# Patient Record
Sex: FEMALE | Race: BLACK OR AFRICAN AMERICAN | NOT HISPANIC OR LATINO | URBAN - METROPOLITAN AREA
[De-identification: names, ages, dates, MRNs, and addresses within clinical notes are randomized per-mention and may not be internally consistent; named-entity substitution may affect disease eponyms.]

---

## 2017-03-01 ENCOUNTER — EMERGENCY (EMERGENCY)
Facility: HOSPITAL | Age: 27
LOS: 0 days | Discharge: HOME | End: 2017-03-01
Admitting: INTERNAL MEDICINE

## 2017-06-27 DIAGNOSIS — R21 RASH AND OTHER NONSPECIFIC SKIN ERUPTION: ICD-10-CM

## 2017-06-27 DIAGNOSIS — Z91.048 OTHER NONMEDICINAL SUBSTANCE ALLERGY STATUS: ICD-10-CM

## 2017-06-27 DIAGNOSIS — Z91.018 ALLERGY TO OTHER FOODS: ICD-10-CM

## 2017-06-27 DIAGNOSIS — J45.909 UNSPECIFIED ASTHMA, UNCOMPLICATED: ICD-10-CM

## 2017-06-27 DIAGNOSIS — B00.1 HERPESVIRAL VESICULAR DERMATITIS: ICD-10-CM

## 2017-07-12 PROBLEM — Z00.00 ENCOUNTER FOR PREVENTIVE HEALTH EXAMINATION: Status: ACTIVE | Noted: 2017-07-12

## 2017-10-26 ENCOUNTER — APPOINTMENT (OUTPATIENT)
Dept: OTOLARYNGOLOGY | Facility: CLINIC | Age: 27
End: 2017-10-26

## 2017-11-13 ENCOUNTER — APPOINTMENT (OUTPATIENT)
Dept: OTOLARYNGOLOGY | Facility: CLINIC | Age: 27
End: 2017-11-13

## 2021-06-29 ENCOUNTER — EMERGENCY (EMERGENCY)
Facility: HOSPITAL | Age: 31
LOS: 0 days | Discharge: HOME | End: 2021-06-29
Attending: EMERGENCY MEDICINE | Admitting: EMERGENCY MEDICINE
Payer: COMMERCIAL

## 2021-06-29 VITALS
RESPIRATION RATE: 18 BRPM | TEMPERATURE: 97 F | DIASTOLIC BLOOD PRESSURE: 88 MMHG | HEART RATE: 62 BPM | SYSTOLIC BLOOD PRESSURE: 137 MMHG | OXYGEN SATURATION: 99 %

## 2021-06-29 DIAGNOSIS — Z98.891 HISTORY OF UTERINE SCAR FROM PREVIOUS SURGERY: Chronic | ICD-10-CM

## 2021-06-29 DIAGNOSIS — N93.9 ABNORMAL UTERINE AND VAGINAL BLEEDING, UNSPECIFIED: ICD-10-CM

## 2021-06-29 DIAGNOSIS — Z87.59 PERSONAL HISTORY OF OTHER COMPLICATIONS OF PREGNANCY, CHILDBIRTH AND THE PUERPERIUM: ICD-10-CM

## 2021-06-29 DIAGNOSIS — M79.605 PAIN IN LEFT LEG: ICD-10-CM

## 2021-06-29 DIAGNOSIS — J45.909 UNSPECIFIED ASTHMA, UNCOMPLICATED: ICD-10-CM

## 2021-06-29 LAB
ALBUMIN SERPL ELPH-MCNC: 3.9 G/DL — SIGNIFICANT CHANGE UP (ref 3.5–5.2)
ALP SERPL-CCNC: 56 U/L — SIGNIFICANT CHANGE UP (ref 30–115)
ALT FLD-CCNC: 18 U/L — SIGNIFICANT CHANGE UP (ref 0–41)
ANION GAP SERPL CALC-SCNC: 9 MMOL/L — SIGNIFICANT CHANGE UP (ref 7–14)
APPEARANCE UR: ABNORMAL
APTT BLD: 39.2 SEC — SIGNIFICANT CHANGE UP (ref 27–39.2)
AST SERPL-CCNC: 29 U/L — SIGNIFICANT CHANGE UP (ref 0–41)
BACTERIA # UR AUTO: NEGATIVE — SIGNIFICANT CHANGE UP
BASOPHILS # BLD AUTO: 0.03 K/UL — SIGNIFICANT CHANGE UP (ref 0–0.2)
BASOPHILS NFR BLD AUTO: 0.4 % — SIGNIFICANT CHANGE UP (ref 0–1)
BILIRUB SERPL-MCNC: 0.4 MG/DL — SIGNIFICANT CHANGE UP (ref 0.2–1.2)
BILIRUB UR-MCNC: NEGATIVE — SIGNIFICANT CHANGE UP
BUN SERPL-MCNC: 12 MG/DL — SIGNIFICANT CHANGE UP (ref 10–20)
CALCIUM SERPL-MCNC: 9.2 MG/DL — SIGNIFICANT CHANGE UP (ref 8.5–10.1)
CHLORIDE SERPL-SCNC: 105 MMOL/L — SIGNIFICANT CHANGE UP (ref 98–110)
CO2 SERPL-SCNC: 23 MMOL/L — SIGNIFICANT CHANGE UP (ref 17–32)
COLOR SPEC: COLORLESS — SIGNIFICANT CHANGE UP
CREAT SERPL-MCNC: 0.8 MG/DL — SIGNIFICANT CHANGE UP (ref 0.7–1.5)
DIFF PNL FLD: ABNORMAL
EOSINOPHIL # BLD AUTO: 0.46 K/UL — SIGNIFICANT CHANGE UP (ref 0–0.7)
EOSINOPHIL NFR BLD AUTO: 6.8 % — SIGNIFICANT CHANGE UP (ref 0–8)
EPI CELLS # UR: 2 /HPF — SIGNIFICANT CHANGE UP (ref 0–5)
GLUCOSE SERPL-MCNC: 84 MG/DL — SIGNIFICANT CHANGE UP (ref 70–99)
GLUCOSE UR QL: NEGATIVE — SIGNIFICANT CHANGE UP
HCT VFR BLD CALC: 37 % — SIGNIFICANT CHANGE UP (ref 37–47)
HGB BLD-MCNC: 11.4 G/DL — LOW (ref 12–16)
HYALINE CASTS # UR AUTO: 3 /LPF — SIGNIFICANT CHANGE UP (ref 0–7)
IMM GRANULOCYTES NFR BLD AUTO: 0.3 % — SIGNIFICANT CHANGE UP (ref 0.1–0.3)
INR BLD: 1.03 RATIO — SIGNIFICANT CHANGE UP (ref 0.65–1.3)
KETONES UR-MCNC: NEGATIVE — SIGNIFICANT CHANGE UP
LEUKOCYTE ESTERASE UR-ACNC: NEGATIVE — SIGNIFICANT CHANGE UP
LYMPHOCYTES # BLD AUTO: 2.08 K/UL — SIGNIFICANT CHANGE UP (ref 1.2–3.4)
LYMPHOCYTES # BLD AUTO: 31 % — SIGNIFICANT CHANGE UP (ref 20.5–51.1)
MCHC RBC-ENTMCNC: 24.1 PG — LOW (ref 27–31)
MCHC RBC-ENTMCNC: 30.8 G/DL — LOW (ref 32–37)
MCV RBC AUTO: 78.1 FL — LOW (ref 81–99)
MONOCYTES # BLD AUTO: 0.4 K/UL — SIGNIFICANT CHANGE UP (ref 0.1–0.6)
MONOCYTES NFR BLD AUTO: 6 % — SIGNIFICANT CHANGE UP (ref 1.7–9.3)
NEUTROPHILS # BLD AUTO: 3.73 K/UL — SIGNIFICANT CHANGE UP (ref 1.4–6.5)
NEUTROPHILS NFR BLD AUTO: 55.5 % — SIGNIFICANT CHANGE UP (ref 42.2–75.2)
NITRITE UR-MCNC: NEGATIVE — SIGNIFICANT CHANGE UP
NRBC # BLD: 0 /100 WBCS — SIGNIFICANT CHANGE UP (ref 0–0)
PH UR: 6.5 — SIGNIFICANT CHANGE UP (ref 5–8)
PLATELET # BLD AUTO: 254 K/UL — SIGNIFICANT CHANGE UP (ref 130–400)
POTASSIUM SERPL-MCNC: 5.2 MMOL/L — HIGH (ref 3.5–5)
POTASSIUM SERPL-SCNC: 5.2 MMOL/L — HIGH (ref 3.5–5)
PROT SERPL-MCNC: 7.5 G/DL — SIGNIFICANT CHANGE UP (ref 6–8)
PROT UR-MCNC: SIGNIFICANT CHANGE UP
PROTHROM AB SERPL-ACNC: 11.8 SEC — SIGNIFICANT CHANGE UP (ref 9.95–12.87)
RBC # BLD: 4.74 M/UL — SIGNIFICANT CHANGE UP (ref 4.2–5.4)
RBC # FLD: 15.5 % — HIGH (ref 11.5–14.5)
RBC CASTS # UR COMP ASSIST: >720 /HPF — HIGH (ref 0–4)
SODIUM SERPL-SCNC: 137 MMOL/L — SIGNIFICANT CHANGE UP (ref 135–146)
SP GR SPEC: 1.01 — SIGNIFICANT CHANGE UP (ref 1.01–1.03)
UROBILINOGEN FLD QL: SIGNIFICANT CHANGE UP
WBC # BLD: 6.72 K/UL — SIGNIFICANT CHANGE UP (ref 4.8–10.8)
WBC # FLD AUTO: 6.72 K/UL — SIGNIFICANT CHANGE UP (ref 4.8–10.8)
WBC UR QL: 2 /HPF — SIGNIFICANT CHANGE UP (ref 0–5)

## 2021-06-29 PROCEDURE — 99285 EMERGENCY DEPT VISIT HI MDM: CPT

## 2021-06-29 PROCEDURE — 99282 EMERGENCY DEPT VISIT SF MDM: CPT

## 2021-06-29 PROCEDURE — 76830 TRANSVAGINAL US NON-OB: CPT | Mod: 26

## 2021-06-29 PROCEDURE — 93970 EXTREMITY STUDY: CPT | Mod: 26

## 2021-06-29 RX ORDER — SODIUM CHLORIDE 9 MG/ML
1000 INJECTION, SOLUTION INTRAVENOUS ONCE
Refills: 0 | Status: COMPLETED | OUTPATIENT
Start: 2021-06-29 | End: 2021-06-29

## 2021-06-29 RX ADMIN — SODIUM CHLORIDE 1000 MILLILITER(S): 9 INJECTION, SOLUTION INTRAVENOUS at 15:17

## 2021-06-29 NOTE — ED PROVIDER NOTE - OBJECTIVE STATEMENT
The pt is a 30y F w/ hx of asthma, s/p  7 wks ago presenting with bright red vaginal bleeding x2 times. Pt states after surgery, she had heavy vaginal bleeding which progressively improved and completely resolved over weeks. Then started again 2 days ago, says today she noted a clot in the toilet bowl. ~1 am tonight she changed her pad, then 2 hours later she had completely soaked through the pad, underwear, shorts, and sheets. She changed and went back to sleep at ~4 am, then woke up at ~10 am and noted she had soaked completely through once again. Denies lightheadedness, chest pain, palpitations, SOB, N/V, abdominal pain, diarrhea, dysuria. Also states that around the time bleeding started 2 days ago, she started having LLE pain. Pain is worst around the shin/knee area and radiates up the leg. No injury/trauma. Does have a family hx of blood clots, states her mom is on AC for this.

## 2021-06-29 NOTE — ED PROVIDER NOTE - NS ED ROS FT
Constitutional: (-) fever  Eyes/ENT: (-) blurry vision, (-) epistaxis  Cardiovascular: (-) chest pain, (-) syncope  Respiratory: (-) cough, (-) shortness of breath  Gastrointestinal: (-) vomiting, (-) diarrhea  : (+) vaginal bleeding  Musculoskeletal: (-) neck pain, (-) back pain, (+) LLE pain   Integumentary: (-) rash, (-) edema  Neurological: (-) headache, (-) altered mental status  Psychiatric: (-) hallucinations  Allergic/Immunologic: (-) pruritus

## 2021-06-29 NOTE — ED PROVIDER NOTE - NSFOLLOWUPINSTRUCTIONS_ED_ALL_ED_FT
Please follow up with your OBGYN Dr. Murrell.     Return to the Emergency Room for any new or worsening symptoms. Please follow up with your OBGYN Dr. Murrell.     Return to the Emergency Room for any new or worsening symptoms.    Abnormal Uterine Bleeding  Abnormal uterine bleeding is unusual bleeding from the uterus. It includes:    Bleeding or spotting between periods.  Bleeding after sex.  Bleeding that is heavier than normal.  Periods that last longer than usual.  Bleeding after menopause.    Abnormal uterine bleeding can affect women at various stages in life, including teenagers, women in their reproductive years, pregnant women, and women who have reached menopause. Common causes of abnormal uterine bleeding include:    Pregnancy.  Growths of tissue (polyps).  A noncancerous tumor in the uterus (fibroid).  Infection.  Cancer.  Hormonal imbalances.    ImageAny type of abnormal bleeding should be evaluated by a health care provider. Many cases are minor and simple to treat, while others are more serious. Treatment will depend on the cause of the bleeding.    Follow these instructions at home:  Monitor your condition for any changes.  Do not use tampons, douche, or have sex if told by your health care provider.  Change your pads often.  Get regular exams that include pelvic exams and cervical cancer screening.  Keep all follow-up visits as told by your health care provider. This is important.  Contact a health care provider if:  Your bleeding lasts for more than one week.  You feel dizzy at times.  You feel nauseous or you vomit.  Get help right away if:  You pass out.  Your bleeding soaks through a pad every hour.  You have abdominal pain.  You have a fever.  You become sweaty or weak.  You pass large blood clots from your vagina.  Summary  Abnormal uterine bleeding is unusual bleeding from the uterus.  Any type of abnormal bleeding should be evaluated by a health care provider. Many cases are minor and simple to treat, while others are more serious.  Treatment will depend on the cause of the bleeding.  This information is not intended to replace advice given to you by your health care provider. Make sure you discuss any questions you have with your health care provider.

## 2021-06-29 NOTE — ED PROVIDER NOTE - PROGRESS NOTE DETAILS
ATTENDING NOTE: I personally evaluated the patient. I reviewed the Resident’s note (as assigned above), and agree with the findings and plan except as documented in my note.   29 y/o F with PMH of asthma, low antithrombin III, for which she follows with hematologist Dr. Mehta at [facility] (last saw him in February), now 7 weeks post partem s/p  and removal of 10cm L ovarian cyst, follows with GYN Dr. Murrell, p/w heavy vaginal bleeding x 2 days and L leg pain. Pt notes she had vaginal bleeding post  x 5 weeks which was initially heavy, then became lighter with yellow discharge, stopped for one week, before bleeding became recurrent again 2 days ago. Notes she goes through about 5 regular sized pads per day. This was pt’s first pregnancy, she was in the hospital for 4 days post-surgery with no complications. Notes after 4 weeks she was placed on Keflex due to bleeding at incision sight, which she completed. Pt with HX of Herpes, not being actively treated for it. (+) Family HX of DVTs in mother and grandmother; grandmother  from PE. No fever, chills, n/v, cp, sob, pleuritic cp, palpitations, diaphoresis, cough, ha/lh/dizziness, numbness/tingling, neck pain/ stiffness, abd pain, diarrhea, constipation, melena/brbpr, urinary symptoms, trauma, weakness, edema, calf pain/swelling/erythema, sick contacts, recent travel or rash.   Vital Signs: I have reviewed the initial vital signs. Constitutional: Non-toxic appearing pt sitting on stretcher in nad. Integumentary: No rash. ENT: MMM NECK: Supple, non-tender, no meningeal signs. Cardiovascular: RRR, radial pulses 2/4 b/l. No JVD. Respiratory: BS present b/l, ctabl, no wheezing or crackles, good resp effort and excursion, good air exchange,  no accessory muscle use, no stridor. Gastrointestinal: BS present throughout all 4 quadrants, soft, nd, nt, no rebound tenderness or guarding, no cvat, sight of incision is clean, dry and intact, no active bleeding. Musculoskeletal: FROM, no edema, no calf pain/swelling/erythema, legs equal in circumference. Neurologic: AAOx3, motor 5/5 and sensation intact throughout UE and LE ext, CN II-XII intact, No facial droop or slurring of speech. No focal deficits.  Plan for labs, TVUS, and duplex. Prelim vascular read: negative for DVT. ATTENDING NOTE: I personally evaluated the patient. I reviewed the Resident’s note (as assigned above), and agree with the findings and plan except as documented in my note.   31 y/o F with PMH of asthma, low antithrombin III, for which she follows with hematologist Dr. Mehta at [facility] (last saw him in February), now 7 weeks post partem s/p  and removal of 10cm L ovarian cyst, follows with GYN Dr. Murrell, p/w heavy vaginal bleeding x 2 days and L leg pain. Pt notes she had vaginal bleeding post  x 5 weeks which was initially heavy, then became lighter with yellow discharge, stopped for one week, before bleeding became recurrent again 2 days ago. Notes she goes through about 5 regular sized pads per day. This was pt’s first pregnancy, she was in the hospital for 4 days post-surgery with no complications. Notes after 4 weeks she was placed on Keflex due to bleeding at incision sight, which she completed. Pt with HX of Herpes, not being actively treated for it. (+) Family HX of DVTs in mother and grandmother; grandmother  from PE. No fever, chills, n/v, cp, sob, pleuritic cp, palpitations, diaphoresis, cough, ha/lh/dizziness, numbness/tingling, neck pain/ stiffness, abd pain, diarrhea, constipation, melena/brbpr, urinary symptoms, trauma, weakness, edema, calf pain/swelling/erythema, sick contacts, recent travel or rash.   Vital Signs: I have reviewed the initial vital signs. Constitutional: Non-toxic appearing pt sitting on stretcher in nad. Integumentary: No rash. ENT: MMM NECK: Supple, non-tender, no meningeal signs. Cardiovascular: RRR, radial pulses 2/4 b/l. No JVD. Respiratory: BS present b/l, ctabl, no wheezing or crackles, good resp effort and excursion, good air exchange,  no accessory muscle use, no stridor. Gastrointestinal: BS present throughout all 4 quadrants, soft, nd, nt, no rebound tenderness or guarding, no cvat, sight of incision is clean, dry and intact, no active bleeding. pelvic exam: ext genitalia +vaginal walls pink, no pain to palpation including over bartholin glands-no swelling/inflammation, difficult to visualize the cervix but cervix intact, closed os, physiological discharged noted in vaginal canal, no visibale masses/lesions, lower pelvic tenderness on bimanual exam, uterus smooth and within normal limits, no adenxa tenderness to palpation, no CMT, (+) pooling of blood, no clots. Musculoskeletal: FROM, no edema, no calf pain/swelling/erythema, legs equal in circumference. Neurologic: AAOx3, motor 5/5 and sensation intact throughout UE and LE ext, CN II-XII intact, No facial droop or slurring of speech. No focal deficits.  Plan for labs, TVUS, and duplex. ATTENDING NOTE: I personally evaluated the patient. I reviewed the Resident’s note (as assigned above), and agree with the findings and plan except as documented in my note.   31 y/o F with PMH of asthma, low antithrombin III, for which she follows with hematologist Dr. Mehta at [facility] (last saw him in February), now 7 weeks post partem s/p  and removal of 10cm L ovarian cyst, follows with GYN Dr. Murrell, p/w heavy vaginal bleeding x 2 days and L leg pain. Pt notes she had vaginal bleeding post  x 5 weeks which was initially heavy, then became lighter with yellow discharge, stopped for one week, before bleeding became recurrent again 2 days ago. Notes she goes through about 5 regular sized pads per day. This was pt’s first pregnancy, she was in the hospital for 4 days post-surgery with no complications. Notes after 4 weeks she was placed on Keflex due to bleeding at incision sight, which she completed. Pt with HX of Herpes, not being actively treated for it. (+) Family HX of DVTs in mother and grandmother; grandmother passed from PE. No fever, chills, n/v, cp, sob, pleuritic cp, palpitations, diaphoresis, cough, ha/lh/dizziness, numbness/tingling, neck pain/ stiffness, abd pain, diarrhea, constipation, melena/brbpr, urinary symptoms, trauma, weakness, edema, calf pain/swelling/erythema, sick contacts, recent travel or rash. Had testing for CT/NG that was egative during pregnancy as well.   Vital Signs: I have reviewed the initial vital signs. Constitutional: Non-toxic appearing pt sitting on stretcher in nad. Integumentary: No rash. ENT: MMM NECK: Supple, non-tender, no meningeal signs. Cardiovascular: RRR, radial pulses 2/4 b/l. No JVD. Respiratory: BS present b/l, ctabl, no wheezing or crackles,   no accessory muscle use, no stridor. Gastrointestinal: BS present throughout all 4 quadrants, soft, nd, nt, no rebound tenderness or guarding, no cvat, sight of incision is clean, dry and intact, no active bleeding. pelvic exam: ext genitalia +vaginal walls pink, no pain to palpation including over bartholin glands-no swelling/inflammation, difficult to visualize the cervix, vaginal bleeding in vault, pooling from cervix, no clots, no visibale masses/lesions, no pelvic tenderness on bimanual exam, uterus smooth and within normal limits, no adenxa tenderness to palpation, no CMT,  Musculoskeletal: FROM, no edema, no calf pain/swelling/erythema, legs equal in circumference. Neurologic: AAOx3, motor 5/5 and sensation intact throughout UE and LE ext, CN II-XII intact, No facial droop or slurring of speech. No focal deficits.  Plan for labs, TVUS, and duplex. ED Attending GREGORY Ruano evaluated pt, speaking to their attending, pt aware, feeling better, aware of all results, will ressess. Pt would like to leave. Understands that OBGYN attending still has not approved of final plan and may make different recommendations. She will f/u with her pvt OBGYN.

## 2021-06-29 NOTE — PROGRESS NOTE ADULT - SUBJECTIVE AND OBJECTIVE BOX
Chief Complaint: vaginal bleeding    HPI: 31 y/o P1 s/p LTCS with left cystectomy (10cm cyst) for failed induction at [facility] POD#49, presents with vaginal bleeding for th passed 2 days, progressively increased last night. Patient changed 4-5 pads today fully soaked, (2 in the ED over a 7 hr period). Denies fever, chills, SOB, chest pain, palpitations, abdominal pain. Patient was breastfeeding 3 times per day and supplementing with formula, stopped breast feeding one week ago. Pregnancy complicated with gTHN, does not take BP's at home, denies headaches, vision changes, or abnormal swelling.  Post-op course uncomplicated.       Ob/Gyn History:    FT LTCS for failed induction  h/o ovarian cyst s/p cystectomy at time of c/s  Denies history uterine fibroids, abnormal paps, or STIs    Denies the following: constitutional symptoms, visual symptoms, cardiovascular symptoms, respiratory symptoms, GI symptoms, musculoskeletal symptoms, skin symptoms, neurologic symptoms, hematologic symptoms, allergic symptoms, psychiatric symptoms  Except any pertinent positives listed.     Home Medications:    Allergies  No Known Allergies    PAST MEDICAL & SURGICAL HISTORY:  Asthma  S/P         FAMILY HISTORY:  No pertinent family history in first degree relatives        SOCIAL HISTORY: Denies cigarette use, alcohol use, or illicit drug use    Vital Signs Last 24 Hrs  T(F): 96.8 (2021 13:10), Max: 96.8 (2021 13:10)  HR: 62 (2021 13:10) (62 - 62)  BP: 137/88 (2021 13:10) (137/88 - 137/88)  RR: 18 (2021 13:10) (18 - 18)    General Appearance - AAOx3, NAD  Heart - S1S2 regular rate and rhythm  Lung - CTA Bilaterally  Abdomen - Soft, nontender, nondistended, no rebound, no rigidity, no guarding, bowel sounds present, pfannenstiel skin incision healing appropriately, intact, no surrounding erythema, induration or discharge    GYN/Pelvis:    Labia Majora - Normal  Labia Minora - Normal  Clitoris - Normal  Urethra - Normal  Vagina - 2cc of dark blood  Cervix - No active bleeding visualized, no CMT    Uterus:  Size - 10w sive anteverted  Tenderness - None  Mass - None  Freely mobile    Adnexa:  Masses - None  Tenderness - None      LABS:                        11.4   6.72  )-----------( 254      ( 2021 14:31 )             37.0       ABO Rh Confirmation: O POS (21 @ 15:22)  ABO RH Interpretation: O POS (21 @ 15:18)  Antibody Screen: NEG (21 @ 15:18)        137  |  105  |  12  ----------------------------<  84  5.2<H>   |  23  |  0.8    Ca    9.2      2021 14:31    TPro  7.5  /  Alb  3.9  /  TBili  0.4  /  DBili  x   /  AST  29  /  ALT  18  /  AlkPhos  56      PT/INR - ( 2021 15:16 )   PT: 11.80 sec;   INR: 1.03 ratio         PTT - ( 2021 15:16 )  PTT:39.2 sec  Urinalysis Basic - ( 2021 16:30 )    Color: Colorless / Appearance: Slightly Turbid / S.011 / pH: x  Gluc: x / Ketone: Negative  / Bili: Negative / Urobili: <2 mg/dL   Blood: x / Protein: Trace / Nitrite: Negative   Leuk Esterase: Negative / RBC: >720 /HPF / WBC 2 /HPF   Sq Epi: x / Non Sq Epi: 2 /HPF / Bacteria: Negative          RADIOLOGY & ADDITIONAL STUDIES:  < from: US Transvaginal (21 @ 14:53) >  EXAM:  US TRANSVAGINAL            PROCEDURE DATE:  2021        INTERPRETATION:  CLINICAL INFORMATION: Vaginal bleeding. Recent  section.    LMP: Unknown    COMPARISON: None available.    TECHNIQUE:  Endovaginal and transabdominal pelvic sonogram. Color and Spectral Doppler was performed.    FINDINGS:  Uterus: 8.9 cm x 4.1 cm x 5.3 cm. Within normal limits.  Endometrium: 10 mm. Trace fluid is seen within the endometrium  Right ovary: 3.0 cm x 2.4 cm x 2.5 cm. Within normal limits. Normal arterial and venous waveforms.  Left ovary: Left ovary is not identified.  Fluid: None.    IMPRESSION:  Trace fluid within the endometrium. Otherwise unremarkable study.      < end of copied text >

## 2021-06-29 NOTE — ED PROVIDER NOTE - PHYSICAL EXAMINATION
Vital Signs: I have reviewed the initial vital signs.  Constitutional: well-nourished, appears stated age, no acute distress  Cardiovascular: regular rate, regular rhythm, well-perfused extremities  Respiratory: unlabored respiratory effort, clear to auscultation bilaterally  Gastrointestinal: soft, non-tender abdomen  Musculoskeletal: supple neck, no lower extremity edema. No LE asymmetries. No LLE calf tenderness.   Integumentary: warm, dry, no rash  Neurologic: awake, alert, extremities’ motor and sensory functions grossly intact  Psychiatric: appropriate mood, appropriate affect

## 2021-06-29 NOTE — ED PROVIDER NOTE - CLINICAL SUMMARY MEDICAL DECISION MAKING FREE TEXT BOX
Patient is a good candidate to attempt outpatient management. Supportive care and home care discussed in detail. Patient aware they may have to return for re-evaluation and possible admission if outpatient treatment fails. Strict return precautions discussed. (hgb 11.4 not tachy, pt bleeding improved while in ed. pt aware of bleeding precautions, signs and symptoms to return for, close follow up with her obgyn , copy of results provided, reports will follow up.

## 2021-06-29 NOTE — ED PROVIDER NOTE - PATIENT PORTAL LINK FT
You can access the FollowMyHealth Patient Portal offered by [facility] by registering at the following website: [email or web address]. By joining [employer]’s FollowMyHealth portal, you will also be able to view your health information using other applications (apps) compatible with our system.

## 2021-06-29 NOTE — ED ADULT TRIAGE NOTE - CHIEF COMPLAINT QUOTE
Pt 7 weeks post=partum s/p C-Scetion and had L side ovarian cyst removed and c/o heavy vaginal bleeding x 2 days. Pt also c/o L Lower Leg pain.  CARMEN Murrell

## 2021-06-29 NOTE — PROGRESS NOTE ADULT - ASSESSMENT
29 y/o P1 s/p LTCS, POD#49, with vaginal bleeding, likely resuming menses, currently clinically and hemodynamically stable  - no acute GYN intervention  - f/u with GYN or @[facility] in 2 weeks   - bleeding precautions given  - dispo per ED    INCOMPLETE!! 31 y/o P1 s/p LTCS, POD#49, with vaginal bleeding, likely resuming menses, currently clinically and hemodynamically stable  - no acute GYN intervention  - f/u with GYN or @[facility] in 2 weeks   - bleeding precautions given  - dispo per ED    Dr. Herrera and Dr. Schaeffer aware

## 2021-07-01 ENCOUNTER — TRANSCRIPTION ENCOUNTER (OUTPATIENT)
Age: 31
End: 2021-07-01

## 2021-07-01 LAB
CULTURE RESULTS: SIGNIFICANT CHANGE UP
SPECIMEN SOURCE: SIGNIFICANT CHANGE UP

## 2023-06-09 PROBLEM — J45.909 UNSPECIFIED ASTHMA, UNCOMPLICATED: Chronic | Status: ACTIVE | Noted: 2021-06-29

## 2023-06-30 ENCOUNTER — APPOINTMENT (OUTPATIENT)
Dept: OBGYN | Facility: CLINIC | Age: 33
End: 2023-06-30
Payer: COMMERCIAL

## 2023-06-30 VITALS
HEIGHT: 66 IN | WEIGHT: 293 LBS | TEMPERATURE: 98 F | HEART RATE: 66 BPM | OXYGEN SATURATION: 99 % | BODY MASS INDEX: 47.09 KG/M2

## 2023-06-30 DIAGNOSIS — N91.2 AMENORRHEA, UNSPECIFIED: ICD-10-CM

## 2023-06-30 DIAGNOSIS — Z01.419 ENCOUNTER FOR GYNECOLOGICAL EXAMINATION (GENERAL) (ROUTINE) W/OUT ABNORMAL FINDINGS: ICD-10-CM

## 2023-06-30 DIAGNOSIS — Z86.79 PERSONAL HISTORY OF OTHER DISEASES OF THE CIRCULATORY SYSTEM: ICD-10-CM

## 2023-06-30 DIAGNOSIS — Z36.85 ENCOUNTER FOR ANTENATAL SCREENING FOR STREPTOCOCCUS B: ICD-10-CM

## 2023-06-30 DIAGNOSIS — Z34.90 ENCOUNTER FOR SUPERVISION OF NORMAL PREGNANCY, UNSPECIFIED, UNSPECIFIED TRIMESTER: ICD-10-CM

## 2023-06-30 DIAGNOSIS — Z82.49 FAMILY HISTORY OF ISCHEMIC HEART DISEASE AND OTHER DISEASES OF THE CIRCULATORY SYSTEM: ICD-10-CM

## 2023-06-30 LAB
BILIRUB UR QL STRIP: NORMAL
GLUCOSE UR-MCNC: NORMAL
HCG UR QL: POSITIVE
HGB UR QL STRIP.AUTO: NORMAL
KETONES UR-MCNC: NORMAL
LEUKOCYTE ESTERASE UR QL STRIP: NORMAL
NITRITE UR QL STRIP: NORMAL
PROT UR STRIP-MCNC: NORMAL
QUALITY CONTROL: YES

## 2023-06-30 PROCEDURE — 81003 URINALYSIS AUTO W/O SCOPE: CPT | Mod: QW

## 2023-06-30 PROCEDURE — 99395 PREV VISIT EST AGE 18-39: CPT | Mod: 25

## 2023-06-30 PROCEDURE — 81025 URINE PREGNANCY TEST: CPT

## 2023-06-30 NOTE — PLAN
[FreeTextEntry1] : Annual with pap/cx/hpv/gbs \par TVS- viable IUP at approx 7.1 weeks lo 2/15/24 +fhr \par pt with ^BMI- 52.3 \par recommend MFM consult-referral provided  \par PNV qd/c/d/zinc/magnesium/calcium \par prenatal lab #1 \par all early pregnancy counseling provided\par increase po fluids/healthy diet- low fat/low sugar/low carbs \par pt to rto 2 weeks/prn

## 2023-06-30 NOTE — PHYSICAL EXAM
[Chaperone Present] : A chaperone was present in the examining room during all aspects of the physical examination [Appropriately responsive] : appropriately responsive [Alert] : alert [No Acute Distress] : no acute distress [Oriented x3] : oriented x3 [Breast Hypertrophy Bilateral] : hypertrophy [Labia Majora] : normal [Labia Minora] : normal [Normal] : normal [Uterine Adnexae] : normal [FreeTextEntry7] : obese

## 2023-06-30 NOTE — PROCEDURE
Completed, left for faxing    [Cervical Pap Smear] : cervical Pap smear [Liquid Base] : liquid base [GC & Chlamydia via Pap] : GC & Chlamydia via Pap [Tolerated Well] : the patient tolerated the procedure well [No Complications] : there were no complications [de-identified] : HPV/GBS

## 2023-06-30 NOTE — HISTORY OF PRESENT ILLNESS
[Y] : Positive pregnancy history [TextBox_4] : New patient presents for annual with amenorrhea \par states + home preg test\par no complaints \par pt with hx- prev p/c/s with gest htn\par also had an ovarian cystectomy at time of c/s \par hx asthma - no meds \par hx- HSV last outbreak 6mths ago  [Mammogramdate] : 0 [PapSmeardate] : 6/2021 [BoneDensityDate] : 0 [ColonoscopyDate] : 0 [PGxTotal] : 1 [[facility] HospitalxFulerm] : 1 [[facility] Centeriving] : 1 [FreeTextEntry1] : C/S X 1

## 2023-07-02 LAB — B-HEM STREP SPEC QL CULT: ABNORMAL

## 2023-07-05 LAB
C TRACH RRNA SPEC QL NAA+PROBE: NOT DETECTED
CYTOLOGY CVX/VAG DOC THIN PREP: NORMAL
HPV HIGH+LOW RISK DNA PNL CVX: NOT DETECTED
N GONORRHOEA RRNA SPEC QL NAA+PROBE: NOT DETECTED
SOURCE AMPLIFICATION: NORMAL
SOURCE AMPLIFICATION: NORMAL
T VAGINALIS RRNA SPEC QL NAA+PROBE: NOT DETECTED

## 2025-04-04 NOTE — ED ADULT NURSE NOTE - FINAL NURSING ELECTRONIC SIGNATURE
Psych consult received. [facility] has reached out to patient's RN to facilitate consult setup.    29-Jun-2021 19:31